# Patient Record
Sex: FEMALE | Race: OTHER
[De-identification: names, ages, dates, MRNs, and addresses within clinical notes are randomized per-mention and may not be internally consistent; named-entity substitution may affect disease eponyms.]

---

## 2019-02-01 ENCOUNTER — HOSPITAL ENCOUNTER (EMERGENCY)
Dept: HOSPITAL 54 - ER | Age: 24
Discharge: HOME | End: 2019-02-01
Payer: COMMERCIAL

## 2019-02-01 VITALS — HEIGHT: 61 IN | BODY MASS INDEX: 27.2 KG/M2 | WEIGHT: 144.06 LBS

## 2019-02-01 VITALS — DIASTOLIC BLOOD PRESSURE: 74 MMHG | SYSTOLIC BLOOD PRESSURE: 118 MMHG

## 2019-02-01 DIAGNOSIS — O23.41: ICD-10-CM

## 2019-02-01 DIAGNOSIS — Z3A.08: ICD-10-CM

## 2019-02-01 DIAGNOSIS — N93.9: ICD-10-CM

## 2019-02-01 DIAGNOSIS — O46.91: Primary | ICD-10-CM

## 2019-02-01 LAB
BASOPHILS # BLD AUTO: 0.1 /CMM (ref 0–0.2)
BASOPHILS NFR BLD AUTO: 0.5 % (ref 0–2)
DEPRECATED SQUAMOUS URNS QL MICRO: (no result) /HPF
EOSINOPHIL NFR BLD AUTO: 0.5 % (ref 0–6)
HCT VFR BLD AUTO: 35 % (ref 33–45)
HGB BLD-MCNC: 12.1 G/DL (ref 11.5–14.8)
LYMPHOCYTES NFR BLD AUTO: 1.1 /CMM (ref 0.8–4.8)
LYMPHOCYTES NFR BLD AUTO: 10.6 % (ref 20–44)
MCHC RBC AUTO-ENTMCNC: 35 G/DL (ref 31–36)
MCV RBC AUTO: 97 FL (ref 82–100)
MONOCYTES NFR BLD AUTO: 0.6 /CMM (ref 0.1–1.3)
MONOCYTES NFR BLD AUTO: 6.2 % (ref 2–12)
NEUTROPHILS # BLD AUTO: 8.5 /CMM (ref 1.8–8.9)
NEUTROPHILS NFR BLD AUTO: 82.2 % (ref 43–81)
PH UR STRIP: 6 [PH] (ref 5–8)
PLATELET # BLD AUTO: 217 /CMM (ref 150–450)
RBC # BLD AUTO: 3.62 MIL/UL (ref 4–5.2)
RBC #/AREA URNS HPF: (no result) /HPF (ref 0–2)
UROBILINOGEN UR STRIP-MCNC: 0.2 EU/DL
WBC #/AREA URNS HPF: (no result) /HPF
WBC NRBC COR # BLD AUTO: 10.4 K/UL (ref 4.3–11)

## 2019-02-01 PROCEDURE — 86850 RBC ANTIBODY SCREEN: CPT

## 2019-02-01 PROCEDURE — 81001 URINALYSIS AUTO W/SCOPE: CPT

## 2019-02-01 PROCEDURE — 87077 CULTURE AEROBIC IDENTIFY: CPT

## 2019-02-01 PROCEDURE — 36415 COLL VENOUS BLD VENIPUNCTURE: CPT

## 2019-02-01 PROCEDURE — 85025 COMPLETE CBC W/AUTO DIFF WBC: CPT

## 2019-02-01 PROCEDURE — 84702 CHORIONIC GONADOTROPIN TEST: CPT

## 2019-02-01 PROCEDURE — 76805 OB US >/= 14 WKS SNGL FETUS: CPT

## 2019-02-01 PROCEDURE — 87086 URINE CULTURE/COLONY COUNT: CPT

## 2019-02-01 PROCEDURE — 99284 EMERGENCY DEPT VISIT MOD MDM: CPT

## 2019-02-01 NOTE — NUR
VB X 3 DAYS ON/OFF WORSE TODAY.; PER PT 8 WEEKS PREGNANT.  STATES SHE "SOAKED A 
PANTILINER" AND IT'S LIGHT PINK IN COLOR.  PT IS AOX4, AMB, VSS, RR EVEN AND 
UNLABORED.  SKIN WARM, DRY, INTACT.  NO ACUTE DISTRESS NOTED.  DENIES SOB, 
DIZZINESS, WEAKNESS, N/V.  FAMILY MEMBER AT BEDSIDE.  READY FOR EVAL.